# Patient Record
Sex: MALE | Race: BLACK OR AFRICAN AMERICAN
[De-identification: names, ages, dates, MRNs, and addresses within clinical notes are randomized per-mention and may not be internally consistent; named-entity substitution may affect disease eponyms.]

---

## 2020-12-04 ENCOUNTER — HOSPITAL ENCOUNTER (INPATIENT)
Dept: HOSPITAL 39 - ER | Age: 65
LOS: 7 days | Discharge: HOME | DRG: 177 | End: 2020-12-11
Attending: NURSE PRACTITIONER | Admitting: NURSE PRACTITIONER
Payer: MEDICARE

## 2020-12-04 DIAGNOSIS — E86.0: ICD-10-CM

## 2020-12-04 DIAGNOSIS — N17.9: ICD-10-CM

## 2020-12-04 DIAGNOSIS — Z88.5: ICD-10-CM

## 2020-12-04 DIAGNOSIS — Z79.899: ICD-10-CM

## 2020-12-04 DIAGNOSIS — R74.8: ICD-10-CM

## 2020-12-04 DIAGNOSIS — I10: ICD-10-CM

## 2020-12-04 DIAGNOSIS — U07.1: Primary | ICD-10-CM

## 2020-12-04 DIAGNOSIS — Z79.4: ICD-10-CM

## 2020-12-04 DIAGNOSIS — R79.1: ICD-10-CM

## 2020-12-04 DIAGNOSIS — I69.354: ICD-10-CM

## 2020-12-04 DIAGNOSIS — Z79.01: ICD-10-CM

## 2020-12-04 DIAGNOSIS — J12.89: ICD-10-CM

## 2020-12-04 DIAGNOSIS — M62.82: ICD-10-CM

## 2020-12-04 DIAGNOSIS — E11.9: ICD-10-CM

## 2020-12-04 DIAGNOSIS — Z79.82: ICD-10-CM

## 2020-12-04 NOTE — CT
EXAM: 



Chest w/o Contrast (accession [id number]), Abdomen/Pelvis w/o

Contrast (accession [id number])



HISTORY: 



dyspnea, COVID-19+, diarrhea



COMPARISON: 



None



TECHNIQUE: 



Contiguous axial images of the chest were obtained from the

thoracic inlet to the upper abdomen without intravenous contrast

followed by multiplanar reformats. This exam was performed

according to our departmental dose-optimization program, which

includes automated exposure control, adjustment of the mA and/or

kV according to patient size and/or use of iterative

reconstruction technique.



FINDINGS: 



Diffuse bilateral groundglass pulmonary opacities, consistent

with viral pneumonitis. No pneumothorax or pleural effusion. No

pneumomediastinum. Heart size normal. No pericardial effusion. No

mediastinal adenopathy. The central airways are patent. Great

vessels are normal.  



The liver, gallbladder, bile ducts, pancreas, spleen and adrenals

are normal. Left simple renal cyst. Otherwise the kidneys,

ureters and bladder are normal. The prostate is normal. The small

bowel is normal. The appendix is normal. The large bowel is

normal. No ascites, pneumatosis or pneumoperitoneum. No

lymphadenopathy. Aortoiliac atherosclerosis. Small fat-containing

umbilical hernia. Bilateral scrotal hydroceles noted.



No fracture or traumatic malalignment



IMPRESSION: 



1.  Diffuse pulmonary opacities consistent with viral pneumonitis

and provided history of COVID.

2.  No acute abdominal or pelvic abnormality. Specifically, there

are no complicating or other discernible features of

enterocolitis.



Electronically signed by:  Jey Loco MD  12/4/2020 10:44 PM

[facility] Workstation: [id number]

## 2020-12-04 NOTE — ED.PDOC
History of Present Illness





- General


Time Seen by Provider: 12/04/20 21:03


Source: patient





- History of Present Illness


Initial Comments: 





65-year-old male with medical history of hypertension, diabetes, hx of CVA who 

presents with chief complaint of shortness of breath.  Patient states his acute 

illness started 8 days ago, initially mild respiratory symptoms, body aches, 

etc.  He reports however in the past 3 days he has had worsening shortness of 

breath which further worsened today.  He now reports 7/10 severity dyspnea.  He 

reports he is unable to ambulate even short distances around the home due to 

shortness of breath.  Also having frequent cough which is occasionally 

productive.  He has been trying DayQuil at home with little relief.  He 

additionally reports fevers with T-max 102 Fahrenheit at home, chills, 

headaches, body aches, lower backaches, sore throat, occasional watery diarrhea.

 Denies any chest pain, abdominal pain, nausea/vomiting, leg swelling.  Appetite

is decreased.  Patient reports he tested positive for COVID-19 8 days ago 

shortly after initial symptoms presented.  He has not been prescribed any 

medications as an outpatient for acute illness.





Patient reports he recently moved here from [place] and has not established 

care with a PCP.  He takes blood pressure medications as well as glipizide and 

Lantus 30 units daily.  He also reports hx of CVA ~12 years ago and since then 

has taken Coumadin daily "to prevent stroke" - current dose is 5 mg daily.  

Denies known hx of a-fib, DVT, PE.








Allergies/Adverse Reactions: 


Allergies





Codeine Allergy (Verified 12/04/20 21:26)


   





Home Medications: 


Ambulatory Orders





Aspirin [Aspirin 81 Low Dose] 81 mg PO DAILY 12/04/20 


Dextromethorphan-Phenylephrine [Vicks Dayquil Cold & Flu] 1 cap PO PRN 12/04/20 


Glipizide [Glipizide Xl] 10 mg PO DAILY 12/04/20 


Hydrochlorothiazide 25 mg PO QAM 12/04/20 


Insulin Glargine 100U/ml [Lantus] 30 unit SUBCU BEDTIME 12/04/20 


Lisinopril 40 mg PO DAILY 12/04/20 


Loperamide Cap [Imodium Cap] 2 mg PO .Q2HR PRN 12/04/20 


Qb12 8 spray PO PRN 12/04/20 


Qboost 8 spray PO PRN 12/04/20 


Qc+ 8 spray PO PRN 12/04/20 


Qd3 8 spray PO PRN 12/04/20 


Qrelief 8 spray PO PRN 12/04/20 


Simvastatin 40 mg PO QPM 12/04/20 


Warfarin Sodium 5 mg PO DAILY 12/04/20 











Review of Systems





- Review of Systems


Review of Systems: 





12/04/20 21:18


as per HPI





All other Systems: Reviewed and Negative





Family Medical History





- Family History


  ** Mother


Family History: Unknown





Physical Exam





- Physical Exam


General Appearance: Alert, Comfortable, No apparent distress


Eye Exam: bilateral normal


Ears, Nose, Throat: hearing grossly normal, pharyngeal erythema - w/o tonsillar 

swelling/exudate


Neck: non-tender, full range of motion, supple, normal inspection


Respiratory: chest non-tender, rales - BL faint rales, worse at bases, w/o 

wheezing or rhonchi, good air movement throughout


Cardiovascular/Chest: normal peripheral pulses, no edema, no gallop, no JVD, no 

murmur, tachycardia


Peripheral Pulses: radial,right: 2+, radial,left: 2+


Gastrointestinal/Abdominal: non tender, soft


Back Exam: normal inspection, no CVA tenderness, no vertebral tenderness


Extremity: normal range of motion, non-tender, normal inspection, no pedal 

edema, no calf tenderness, normal capillary refill


Neurologic: CNs II-XII nml as tested, no motor/sensory deficits, alert, normal 

mood/affect, oriented x 3


Skin Exam: normal color, warm/dry





Progress





- Progress


Progress: 





12/04/20 21:19


Acute hypoxic respiratory effort


-Appears most likely due to acute COVID-19 pneumonia/infection.  Consider also 

bacterial pneumonia, sepsis, CHF, COPD/asthma, PE, ACS, other viral infections, 

strep, gastroenteritis, electrolyte derangement, hypoglycemia, other


-Patient with SPO2 86% on room air upon arrival, corrected to 95% on 2 L 

supplemental oxygen by nasal cannula.  Slight tachycardia present, vitals 

otherwise stable.  Patient is speaking in short sentences but appears 

comfortable and in no acute distress.  Faint crackles BL but no wheezing noted


-Obtain stat COVID-19 panel, cardiac work-up, RP 2 panel, strep, UA, lactate


-Place peripheral IV, 1 L normal saline bolus, Decadron 6 mg IV





12/04/20 22:10


-Labs pertinent for: WBC 12,800 with 81% segs, 10% lymphs, lactate 1.4.  T Bili 

2.1 (indirect 1.6, direct 0.5), alk phos 62, , , BUN 61, Cr 1.73, 

Na 131, K 4.3.  Trop <0.02, BNP <15.  D-dimer elevated 1730, LDH 1266, CRP 11.2 

- c/w COVID-19 infection.  Also with coagulopathy - INR 8.1, PTT 73.  CPK 

11,000.


-CXR reveals BL peripheral groundglass opacities.


-Given COVID-19, dyspnea, elevated T bili/LFTs, will obtain CT imaging chest/

abd/pelvis.  Given JANES, unable to use IV contrast.





12/05/20 00:23


-CT imaging revealed bilateral peripheral groundglass opacities consistent with 

COVID-19 pneumonia.  No other acute processes were noted in the 

chest/abdomen/pelvis.


-Patient has remained stable in the ED.  He is maintaining 94% SPO2 on 3 L by 

nasal cannula oxygen.  Continues to have mild tachycardia.  Blood pressure has 

been normal.  Patient reports that his dyspnea is improving with the 

supplemental oxygen and IV steroids given in the ED.  Discussed all findings 

with the patient as well as diagnoses of COVID-19 pneumonia, acute kidney 

injury, acute hepatic dysfunction, coagulopathy, rhabdomyolysis, dehydration.  

Suspect all of these diagnoses are sequelae of COVID-19 infection.


-I discussed the patient with Muna Jenkins who accepts for admission.  For the 

coagulopathy, pt will need to hold his Coumadin in the hospital.  Muna to decide

on possible Vitamin K administration.  Also to decide on Remdesivir given 

kidney/liver dysfunction.








Fidel Roberts MD


Billing #[id number]











12/04/20 21:02


IV Care:Saline Lock per [facility] STAT 


Isolation:Airborne ONCE 


Telemetry STAT 


URINALYSIS Stat 





12/04/20 21:15


EKG STAT 


Oxygen STAT 


Pulse Ox, Continuous Monitoring STAT 





12/04/20 21:16


STREP A SCREEN CULTURE Stat 





12/05/20 00:23


ED Intent to Admit Routine 





12/05/20 21:15


Oxygen STAT 


Pulse Ox, Continuous Monitoring STAT 





12/06/20 21:15


Pulse Ox, Continuous Monitoring STAT 








                         Laboratory Results - last 24 hr











  12/04/20 12/04/20 12/04/20





  21:16 21:17 21:17


 


WBC   12.8 H 


 


RBC   5.44 


 


Hgb   17.1 


 


Hct   49.7 


 


MCV   91.3 


 


MCH   31.5 H 


 


MCHC   34.5 


 


RDW   13.6 


 


Plt Count   249 


 


MPV   7.9 


 


Absolute Neuts (auto)   10.30 H 


 


Absolute Lymphs (auto)   1.40 


 


Absolute Monos (auto)   1.00 H 


 


Absolute Eos (auto)   0.00 


 


Absolute Basos (auto)   0.00 


 


Neutrophils %   80.9 H 


 


Lymphocytes %   10.6 L 


 


Monocytes %   8.2 


 


Eosinophils %   0.0 L 


 


Basophils %   0.3 


 


PT   


 


INR   


 


PTT (SP)    73.8 H*


 


D-Dimer, Quantitative    1730.0 H*


 


Sodium   


 


Potassium   


 


Chloride   


 


Carbon Dioxide   


 


Anion Gap   


 


BUN   


 


Creatinine   


 


BUN/Creatinine Ratio   


 


Random Glucose   


 


Serum Osmolality   


 


Lactic Acid   


 


Calcium   


 


Magnesium   


 


Total Bilirubin   


 


Direct Bilirubin   


 


Indirect Bilirubin   


 


AST   


 


ALT   


 


Alkaline Phosphatase   


 


LD Total   


 


Creatine Kinase   


 


Troponin I   


 


C-Reactive Protein   


 


B-Natriuretic Peptide   


 


Serum Total Protein   


 


Albumin   


 


Globulin   


 


Albumin/Globulin Ratio   


 


Group A Strep Rapid  Negative  














  12/04/20 12/04/20 12/04/20





  21:17 21:17 21:17


 


WBC   


 


RBC   


 


Hgb   


 


Hct   


 


MCV   


 


MCH   


 


MCHC   


 


RDW   


 


Plt Count   


 


MPV   


 


Absolute Neuts (auto)   


 


Absolute Lymphs (auto)   


 


Absolute Monos (auto)   


 


Absolute Eos (auto)   


 


Absolute Basos (auto)   


 


Neutrophils %   


 


Lymphocytes %   


 


Monocytes %   


 


Eosinophils %   


 


Basophils %   


 


PT   


 


INR   


 


PTT (SP)   


 


D-Dimer, Quantitative   


 


Sodium  131 L  


 


Potassium  4.3  


 


Chloride  94 L  


 


Carbon Dioxide  26  


 


Anion Gap  15.3  


 


BUN  61 H  


 


Creatinine  1.73 H  


 


BUN/Creatinine Ratio  35.3 H  


 


Random Glucose  197 H  


 


Serum Osmolality  285.4  


 


Lactic Acid    1.4


 


Calcium  8.0 L  


 


Magnesium  2.4  


 


Total Bilirubin  2.1 H*  


 


Direct Bilirubin   


 


Indirect Bilirubin   


 


AST  362 H  


 


ALT  205 H  


 


Alkaline Phosphatase  62  


 


LD Total  1266 H  


 


Creatine Kinase  46946 H*  


 


Troponin I   0.02 


 


C-Reactive Protein  11.2 H*  


 


B-Natriuretic Peptide  < 15.0  


 


Serum Total Protein  7.5  


 


Albumin  3.3  


 


Globulin  4.2 H  


 


Albumin/Globulin Ratio  0.8 L  


 


Group A Strep Rapid   














  12/04/20 12/04/20





  21:17 23:07


 


WBC  


 


RBC  


 


Hgb  


 


Hct  


 


MCV  


 


MCH  


 


MCHC  


 


RDW  


 


Plt Count  


 


MPV  


 


Absolute Neuts (auto)  


 


Absolute Lymphs (auto)  


 


Absolute Monos (auto)  


 


Absolute Eos (auto)  


 


Absolute Basos (auto)  


 


Neutrophils %  


 


Lymphocytes %  


 


Monocytes %  


 


Eosinophils %  


 


Basophils %  


 


PT   80.2 H*


 


INR   8.19 H*


 


PTT (SP)  


 


D-Dimer, Quantitative  


 


Sodium  


 


Potassium  


 


Chloride  


 


Carbon Dioxide  


 


Anion Gap  


 


BUN  


 


Creatinine  


 


BUN/Creatinine Ratio  


 


Random Glucose  


 


Serum Osmolality  


 


Lactic Acid  


 


Calcium  


 


Magnesium  


 


Total Bilirubin  2.1 H* 


 


Direct Bilirubin  0.6 H 


 


Indirect Bilirubin  1.5 H 


 


AST  


 


ALT  


 


Alkaline Phosphatase  


 


LD Total  


 


Creatine Kinase  


 


Troponin I  


 


C-Reactive Protein  


 


B-Natriuretic Peptide  


 


Serum Total Protein  


 


Albumin  


 


Globulin  


 


Albumin/Globulin Ratio  


 


Group A Strep Rapid  























- EKG/XRAY/CT


EKG: Sinus, Tachy - Sinus tachycardia, heart rate 100, no ST elevations noted, Q

waves noted in anteroseptal leads likely indicative of prior MI, axis normal, 

intervals normal, no prior EKG for comparison.


XRAY: chest - Bilateral peripheral groundglass opacities noted consistent with 

COVID-19 pneumonia per my read





Departure





- Departure


Clinical Impression: 


 Hypoxemia, Pneumonia due to COVID-19 virus, COVID-19, Rhabdomyolysis due to 

COVID-19, Acute kidney injury, Hepatic dysfunction, Dehydration, Coagulopathy





Time of Disposition: 00:23


Disposition: Admit Patient


Condition: Poor


Diet: diabetic diet


Home Medications: 


Ambulatory Orders





Aspirin [Aspirin 81 Low Dose] 81 mg PO DAILY 12/04/20 


Dextromethorphan-Phenylephrine [Vicks Dayquil Cold & Flu] 1 cap PO PRN 12/04/20 


Glipizide [Glipizide Xl] 10 mg PO DAILY 12/04/20 


Hydrochlorothiazide 25 mg PO QAM 12/04/20 


Insulin Glargine 100U/ml [Lantus] 30 unit SUBCU BEDTIME 12/04/20 


Lisinopril 40 mg PO DAILY 12/04/20 


Loperamide Cap [Imodium Cap] 2 mg PO .Q2HR PRN 12/04/20 


Qb12 8 spray PO PRN 12/04/20 


Qboost 8 spray PO PRN 12/04/20 


Qc+ 8 spray PO PRN 12/04/20 


Qd3 8 spray PO PRN 12/04/20 


Qrelief 8 spray PO PRN 12/04/20 


Simvastatin 40 mg PO QPM 12/04/20 


Warfarin Sodium 5 mg PO DAILY 12/04/20 











Critical Care Note





- Critical Care Note


Total Time (mins): 30


Comments: 





Critical Care Time: Upon my evaluation, this patient had a high probability of 

life-threatening deterioration due to COVID-19 pneumonia, acute hypoxia, which 

required my direct attention, intervention, and management. I have provided 30 

minutes of critical care time exclusive of separately billable procedures. My 

time included: direct patient care, review of labs and radiology, obtaining 

history from and counseling the patient, discussion with other medical 

personnel, documentation, and monitoring for potential decompensation.








Decision To Admit





- Decistion To Admit


Decision to Admit Reason: Admit from ER


Decision to Admit Date: 12/05/20


Decision to Admit Time: 00:23

## 2020-12-04 NOTE — RAD
XR CHEST 1 VIEW



HISTORY: COVID-19+, dyspnea.



COMPARISON: None.



FINDINGS:



The heart size is within normal limits. There is no pulmonary

vascular congestion. There is a patchy opacity at the left lung

base. No pleural effusions or pneumothorax. No acute bony

findings are seen.



IMPRESSION:



Patchy opacity in the left lung which may represent infection.



Electronically signed by:  Raul Nicholson MD  12/4/2020 9:59 PM CST

Workstation: [id number]

## 2020-12-04 NOTE — CT
EXAM: 



Chest w/o Contrast (accession [id number]), Abdomen/Pelvis w/o

Contrast (accession [id number])



HISTORY: 



dyspnea, COVID-19+, diarrhea



COMPARISON: 



None



TECHNIQUE: 



Contiguous axial images of the chest were obtained from the

thoracic inlet to the upper abdomen without intravenous contrast

followed by multiplanar reformats. This exam was performed

according to our departmental dose-optimization program, which

includes automated exposure control, adjustment of the mA and/or

kV according to patient size and/or use of iterative

reconstruction technique.



FINDINGS: 



Diffuse bilateral groundglass pulmonary opacities, consistent

with viral pneumonitis. No pneumothorax or pleural effusion. No

pneumomediastinum. Heart size normal. No pericardial effusion. No

mediastinal adenopathy. The central airways are patent. Great

vessels are normal.  



The liver, gallbladder, bile ducts, pancreas, spleen and adrenals

are normal. Left simple renal cyst. Otherwise the kidneys,

ureters and bladder are normal. The prostate is normal. The small

bowel is normal. The appendix is normal. The large bowel is

normal. No ascites, pneumatosis or pneumoperitoneum. No

lymphadenopathy. Aortoiliac atherosclerosis. Small fat-containing

umbilical hernia. Bilateral scrotal hydroceles noted.



No fracture or traumatic malalignment



IMPRESSION: 



1.  Diffuse pulmonary opacities consistent with viral pneumonitis

and provided history of COVID.

2.  No acute abdominal or pelvic abnormality. Specifically, there

are no complicating or other discernible features of

enterocolitis.



Electronically signed by:  Jey Loco MD  12/4/2020 10:44 PM

[facility] Workstation: [id number]

## 2020-12-05 PROCEDURE — XW033E5 INTRODUCTION OF REMDESIVIR ANTI-INFECTIVE INTO PERIPHERAL VEIN, PERCUTANEOUS APPROACH, NEW TECHNOLOGY GROUP 5: ICD-10-PCS | Performed by: NURSE PRACTITIONER

## 2020-12-05 RX ADMIN — REMDESIVIR SCH MLS/HR: 5 INJECTION INTRAVENOUS at 12:40

## 2020-12-05 RX ADMIN — INSULIN LISPRO SCH UNITS: 100 INJECTION, SOLUTION INTRAVENOUS; SUBCUTANEOUS at 08:00

## 2020-12-05 RX ADMIN — Medication SCH ML: at 20:58

## 2020-12-05 RX ADMIN — GUAIFENESIN SCH MG: 600 TABLET, EXTENDED RELEASE ORAL at 20:58

## 2020-12-05 RX ADMIN — ALBUTEROL SULFATE SCH PUFF: 90 AEROSOL, METERED RESPIRATORY (INHALATION) at 21:49

## 2020-12-05 RX ADMIN — INSULIN LISPRO SCH UNITS: 100 INJECTION, SOLUTION INTRAVENOUS; SUBCUTANEOUS at 21:01

## 2020-12-05 RX ADMIN — ALBUTEROL SULFATE SCH PUFF: 90 AEROSOL, METERED RESPIRATORY (INHALATION) at 12:50

## 2020-12-05 RX ADMIN — CEFTRIAXONE SCH MLS/HR: 1 INJECTION, POWDER, FOR SOLUTION INTRAMUSCULAR; INTRAVENOUS at 02:34

## 2020-12-05 RX ADMIN — ALBUTEROL SULFATE SCH PUFF: 90 AEROSOL, METERED RESPIRATORY (INHALATION) at 10:00

## 2020-12-05 RX ADMIN — PANTOPRAZOLE SODIUM SCH MG: 40 INJECTION, POWDER, FOR SOLUTION INTRAVENOUS at 06:17

## 2020-12-05 RX ADMIN — ALUMINUM ZIRCONIUM TRICHLOROHYDREX GLY SCH MG: 0.2 STICK TOPICAL at 10:50

## 2020-12-05 RX ADMIN — Medication SCH ML: at 09:30

## 2020-12-05 RX ADMIN — ALBUTEROL SULFATE SCH PUFF: 90 AEROSOL, METERED RESPIRATORY (INHALATION) at 17:18

## 2020-12-05 RX ADMIN — GUAIFENESIN SCH MG: 600 TABLET, EXTENDED RELEASE ORAL at 10:50

## 2020-12-05 RX ADMIN — Medication PRN MLS/HR: at 02:44

## 2020-12-05 RX ADMIN — DEXAMETHASONE SODIUM PHOSPHATE SCH MG: 10 INJECTION INTRAMUSCULAR; INTRAVENOUS at 10:50

## 2020-12-05 RX ADMIN — INSULIN LISPRO SCH UNITS: 100 INJECTION, SOLUTION INTRAVENOUS; SUBCUTANEOUS at 17:00

## 2020-12-05 RX ADMIN — AZITHROMYCIN DIHYDRATE SCH MLS/HR: 500 INJECTION, POWDER, LYOPHILIZED, FOR SOLUTION INTRAVENOUS at 06:18

## 2020-12-05 RX ADMIN — INSULIN LISPRO SCH UNITS: 100 INJECTION, SOLUTION INTRAVENOUS; SUBCUTANEOUS at 12:15

## 2020-12-05 RX ADMIN — INSULIN DETEMIR SCH UNITS: 100 INJECTION, SOLUTION SUBCUTANEOUS at 14:33

## 2020-12-05 NOTE — HP
SUPERVISING PHYSICIAN:   Chris Castellon MD



CHIEF COMPLAINT:  Shortness of breath.



HISTORY OF PRESENT ILLNESS:   Mr. Shine is a 65 year-old -American male 
who has a past medical history of hypertension, diabetes and a past CVA and 
presented to the Emergency Room for some increasing shortness of breath.  He 
noted initially that he had moved from [place] to [place] and prior to leaving
for [place] about 8 days ago, he tested negative for Covid-19.  On arrival to 
[place], after about 3 days of being here he started having some shortness of 
breath with some fever noted subjectively with a T-max of 102.  He then went to 
a walk-in clinic and was retested for Covid-19 and found to be positive.  
Initial laboratory studies in the Emergency Room showed a white count of 12,800 
with a left shift.  Also of note was that he is on Coumadin supposedly for a 
previous CVA and initially his PT in the Emergency Room was 80.2 with INR of 
8.19.  D-dimer was elevated at 1730.  The patient does endorse that during the 
move in the last 3 to 4 days he actually sustained a fall while trying to move 
some mattresses in a standing position but denies any bleeding or bruising.  He 
was not having any chest pain.  Vital signs showed he was mildly tachycardic 
with a pulse of 105, blood pressure 128/85, respirations 18.  Initially he was 
showing 85% saturation on room air.  After treatment was going to 96% on 2 liter
nasal cannula.  The remainder of his labs did show he had an initial blood sugar
of 197 with lactic acid normal at 1.4.  Bilirubin was elevated at 2.1 with 
elevated AST at 362 and ALT of 205.  Also of note was an elevated creatinine 
kinase at 11,069 and serum protein 11.2.  Troponins were 0.02 and less than 0.02
prior to admission.  His initial chemistries showed his creatinine at 1.73 with 
BUN of 61.  Given the degree of elevated CK levels, creatinine history and 
recent Covid-19 infection, the patient was started on treatment for Covid 
pneumonitis with azithromycin, Rocephin in Remdesivir.  He was also given an 
injection of vitamin K IM 5 mg for the supratherapeutic INR.  The patient is now
going to be admitted for further evaluation and management of Covid-19 and 
supratherapeutic INR with questionable rhabdomyolysis with elevated CK levels. 



PAST MEDICAL HISTORY: 

1.   Hypertension.

2.   Diabetes mellitus type 2.

3.   Thrombolytic CVA in , on Coumadin with left-sided lower extremity 
weakness, residual.



PAST SURGICAL HISTORY:  No  major surgeries listed.



CURRENT MEDICATIONS:

1.   Simvastatin 40 mg daily.

2.   Lisinopril 40 mg daily.

3.   Glipizide 10 mg daily.

4.   Coumadin 5 mg daily.

5.   Hydrochlorothiazide 25 mg daily.

6.   Lantus 30 units at bedtime.



ALLERGIES:  CODEINE



FAMILY HISTORY:   Mother  at age 78 of natural causes.  Father  
at age 72 with no listed major medical history.  He has 3 children all healthy. 
He has one brother, one sister reported healthy.



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 
from [place] to [place].  He is [employer], currently living with a girlfriend.
 He has no history of tobacco, alcohol or illicit drug use.



REVIEW OF SYSTEMS: 

GENERAL:   No general malaise with some reported fever, T-max 102 at home. No 
unintentional weight loss or weight gain.

HEENT:   Denies any headache, vision changes, sore throat, nasal congestion, 
earaches.

CHEST:  As noted in history of present illness, increasing shortness of breath 
with a nonproductive cough.

HEART:  Denies chest pain, palpitations or syncopal episodes. 

GASTROINTESTINAL:  Denies normal vaginal delivery, constipation or abdominal 
pain.

GENITOURINARY:  Denies any dysuria, hematuria, polyuria.

MUSCULOSKELETAL:  Denies arthralgias, joint swelling.

INTEGUMENT:  Denies lesions. moles or unexplained changes

NEUROLOGIC:  Denies ataxia, seizures, syncopal episodes, dizziness, headaches, 
vision changes or other focal deficits.

HEMATOLOGIC:  Denies unexplained bleeding, bruising or transfusion reaction.



PHYSICAL EXAMINATION: 



VITAL SIGNS:   Initially in the Emergency Room, temperature 96, pulse 103, blood
pressure 135/86, respirations 18 to 22 with room air saturations at 85% 
initially and improved with treatment up to 96% on 2 liter nasal cannula.



GENERAL:  The patient looks to be resting comfortably and in no acute distress. 
He is alert. 



HEENT:   Tympanic membranes are clear bilaterally.  Oropharynx is pink, moist 
without lesions.



NECK:   Supple, non-tender, full range of motion.  No jugular venous distention.



CHEST:  Lung sounds are fairly clear throughout, just a little diminished 
towards the bases.



CARDIOVASCULAR:  Regular rate and rhythm.



ABDOMEN:   Soft, non-tender, positive bowel sounds.



EXTREMITIES:   Without cyanosis, clubbing, or edema.



BACK:  Without CVA or vertebral tenderness.



NEUROLOGIC:   Cranial nerves II through XII grossly tact.  Facial features are 
symmetrical.  Extraocular movements are within normal limits with no notable 
nystagmus.  No discernible weakness when compared to lower extremities, either 
right or left.



SKIN:  Warm and normal color.  No lesions or rashes.



LABORATORY:  White count 12,800, hemoglobin 17.1, hematocrit 49.7, platelet 
count 349,000.  Differential does show a left shift.  Coagulation studies showed
an elevated D-dimer at 1730 with elevated INR of 8.19 with PT of 80.2 with PTT 
of 73.8.  Chemistries: sodium 131, glucose 197, potassium 4.3, initial BUN of 
61, creatinine 1.73, lactic acid 1.4, total bilirubin 2.1.  Direct bilirubin was
0.6 and indirect was 1.5.  , , creatinine kinase 1169 with  C-
reactive protein of 11.2.  He had 2 sets of troponin prior to admission and all 
within normal limits.  Group A rapid strep was negative.  Respiratory panel did 
show positive for Covid-19 but negative for all other bacterial and viral 
targets including influenza A and B.  



RADIOLOGY:  Abdomen/pelvic CT shows diffuse pulmonary opacities consistent with 
viral pneumonitis, no acute abdominal pelvic abnormalities.  Specifically no 
descendable features of enterocolitis.  CT of the chest, again diffuse pulmonary
opacities consistent with  pneumonitis.  



ASSESSMENT: 

1.  Covid-19 pneumonitis.

2.  Supratherapeutic INR with no signs of acute bleed, etiology uncertain.

3.  Poorly controlled hypertension.

4.  Diabetes mellitus type 2.

5.  History of previous CVA on Coumadin since .

6.  Rhabdomyolysis as noted with an elevated CPK level on admission, possibly 
due to a recent fall 

     with some exacerbation due to statin medications.

7.  Acute renal insufficiency likely due to some mild prerenal azotemia.

8.  Elevated liver functions including a total bilirubin, AST, ALT, etiology 
uncertain, with consideration due 

     to poor medical followup and drug interaction of drug side effects on 
statin with patient also showing 

     elevated CK levels.



PLAN:   Mr. Ángel Shine is going to be admitted for multiple problems and 
initially with Covid-19 pneumonitis for which he will be started on treatment 
for with Remdesivir, Decadron, Rocephin and azithromycin.  In regards to the 
elevated INR levels, we will hold off on any additional vitamin K as after 
discussion with the patient taking his Coumadin medication, probably less than 2
hours of being in the Emergency Room and having now completed. Therefore, we 
will observe his INRs in the morning.  He will be on sliding scale per protocol.
 He will be on Protonix for gastric protection.  Will go ahead and stat him on 
some Levemir and manage his blood sugar until better controlled.  I would 
anticipate his length of stay to be at least 2 or 3 days.  Until we can 
transition him to outpatient management, we will continue to monitor and treat 
as needed.



#[id number]

[facility]

## 2020-12-05 NOTE — RAD
EXAM:  XR Chest, 1 View



CLINICAL HISTORY:  The patient is 65 years old and is Male; covid



TECHNIQUE:  Frontal view of the chest.



COMPARISON:  Chest x-ray 12/4/2020.



FINDINGS:

  Lungs:  Diffuse bilateral airspace disease unchanged from the

prior exam.

  Pleural space:  Unremarkable.  No pneumothorax.

  Heart:  Unremarkable.  No cardiomegaly.

  Mediastinum:  Unremarkable.

  Bones/joints:  Unremarkable.



IMPRESSION:     

  Diffuse bilateral airspace disease unchanged from the prior

exam.



Electronically signed by:  Tomasz Dubois MD  12/5/2020 7:11

AM [facility] Workstation: [id number]

## 2020-12-06 RX ADMIN — PANTOPRAZOLE SODIUM SCH MG: 40 INJECTION, POWDER, FOR SOLUTION INTRAVENOUS at 05:54

## 2020-12-06 RX ADMIN — AZITHROMYCIN DIHYDRATE SCH MLS/HR: 500 INJECTION, POWDER, LYOPHILIZED, FOR SOLUTION INTRAVENOUS at 05:16

## 2020-12-06 RX ADMIN — ALBUTEROL SULFATE SCH PUFF: 90 AEROSOL, METERED RESPIRATORY (INHALATION) at 17:30

## 2020-12-06 RX ADMIN — INSULIN LISPRO SCH UNITS: 100 INJECTION, SOLUTION INTRAVENOUS; SUBCUTANEOUS at 11:30

## 2020-12-06 RX ADMIN — INSULIN LISPRO SCH UNITS: 100 INJECTION, SOLUTION INTRAVENOUS; SUBCUTANEOUS at 09:13

## 2020-12-06 RX ADMIN — LISINOPRIL SCH MG: 10 TABLET ORAL at 10:24

## 2020-12-06 RX ADMIN — INSULIN LISPRO SCH UNITS: 100 INJECTION, SOLUTION INTRAVENOUS; SUBCUTANEOUS at 20:22

## 2020-12-06 RX ADMIN — INSULIN LISPRO SCH UNITS: 100 INJECTION, SOLUTION INTRAVENOUS; SUBCUTANEOUS at 18:50

## 2020-12-06 RX ADMIN — ALBUTEROL SULFATE SCH PUFF: 90 AEROSOL, METERED RESPIRATORY (INHALATION) at 14:00

## 2020-12-06 RX ADMIN — ALBUTEROL SULFATE SCH PUFF: 90 AEROSOL, METERED RESPIRATORY (INHALATION) at 20:53

## 2020-12-06 RX ADMIN — REMDESIVIR SCH MLS/HR: 5 INJECTION INTRAVENOUS at 10:24

## 2020-12-06 RX ADMIN — INSULIN LISPRO SCH UNITS: 100 INJECTION, SOLUTION INTRAVENOUS; SUBCUTANEOUS at 09:12

## 2020-12-06 RX ADMIN — Medication SCH ML: at 20:15

## 2020-12-06 RX ADMIN — GUAIFENESIN SCH MG: 600 TABLET, EXTENDED RELEASE ORAL at 10:25

## 2020-12-06 RX ADMIN — Medication PRN MLS/HR: at 17:04

## 2020-12-06 RX ADMIN — CEFTRIAXONE SCH MLS/HR: 1 INJECTION, POWDER, FOR SOLUTION INTRAMUSCULAR; INTRAVENOUS at 01:55

## 2020-12-06 RX ADMIN — INSULIN LISPRO SCH UNITS: 100 INJECTION, SOLUTION INTRAVENOUS; SUBCUTANEOUS at 18:51

## 2020-12-06 RX ADMIN — PANTOPRAZOLE SODIUM SCH MG: 40 INJECTION, POWDER, FOR SOLUTION INTRAVENOUS at 05:16

## 2020-12-06 RX ADMIN — DEXAMETHASONE SODIUM PHOSPHATE SCH MG: 10 INJECTION INTRAMUSCULAR; INTRAVENOUS at 10:24

## 2020-12-06 RX ADMIN — Medication PRN MLS/HR: at 01:49

## 2020-12-06 RX ADMIN — INSULIN DETEMIR SCH UNITS: 100 INJECTION, SOLUTION SUBCUTANEOUS at 10:37

## 2020-12-06 RX ADMIN — Medication SCH ML: at 10:25

## 2020-12-06 RX ADMIN — ALBUTEROL SULFATE SCH PUFF: 90 AEROSOL, METERED RESPIRATORY (INHALATION) at 09:40

## 2020-12-06 RX ADMIN — GUAIFENESIN SCH MG: 600 TABLET, EXTENDED RELEASE ORAL at 20:15

## 2020-12-06 RX ADMIN — ALUMINUM ZIRCONIUM TRICHLOROHYDREX GLY SCH MG: 0.2 STICK TOPICAL at 10:24

## 2020-12-06 NOTE — PN
SUPERVISING PHYSICIAN: :  Chris Castellon MD



DATE:  12/06/20



SUBJECTIVE:  The patient says he feels pretty good today.  He has had no 
episodes of bleeding through the night.  His shortness of breath is not quite as
severe as yesterday.



OBJECTIVE:

VITAL SIGNS:  Temperature 98, pulse 97, blood pressure 158/97, respirations 18, 
oxygen saturation 94% on 2.5 liters nasal cannula.

GENERAL:  Patient looks to be resting comfortably.  He is alert.

CHEST:  Diminished bilaterality. No obvious rhonchi, rales, or wheezes.

HEART: Regular rate and rhythm.

ABDOMEN:  Soft, non-tender, positive bowel sound.

EXTREMITIES:  Without edema.

NEUROLOGICAL:  He is alert and oriented x 3.



LABORATORY:  Chemistries show normal electrolytes with creatinine 1.26, this is 
after admission of 1.73, calcium 8.1, blood sugar range between 187 and 246.  
Direct bilirubin is still elevated but down to 1.7 compared to 2.1 on admission.
 CK is down to 4255 from 1169.  Coagulation studies show INR today of 5.42.



MICROBIOLOGY:  Group A strep cultures pending.



RADIOLOGY:  No additional radiographic studies today.



ASSESSMENT: 

1.  Covid-19 pneumonitis.

2.  Supratherapeutic INR with no signs of acute bleed, etiology uncertain.

3.  Poorly controlled hypertension.

4.  Diabetes mellitus type 2.

5.  History of previous CVA on Coumadin since 2007.

6.  Rhabdomyolysis as noted with an elevated CPK level on admission, possibly 
due to a recent fall 

     with some exacerbation due to statin medications.

7.  Acute renal insufficiency likely due to some mild prerenal azotemia.

8.  Elevated liver functions including a total bilirubin, AST, ALT, etiology 
uncertain, with consideration due 

     to poor medical followup and drug interaction of drug side effects on 
statin with patient also showing 

     elevated CK levels.



PLAN: We will continue with current plan of care at his point for Covid-19 
pneumonitis with Remdesivir, Decadron, Rocephin and azithromycin. We are still 
holding his Coumadin, not giving any additional vitamin K.  His INR is now down 
to more acceptable levels.  He has not had any episodes of bleeding.  Will 
follow his labs including INR.  He remains on Protonix, we do have him on 
Levemir for control of his blood sugar on sliding scale.  Hopefully, we will be 
able to transition him to outpatient management in the next couple days.  Until 
then, we will continue to monitor and treat as needed..



#[id number]

MTDD

## 2020-12-07 RX ADMIN — INSULIN LISPRO SCH: 100 INJECTION, SOLUTION INTRAVENOUS; SUBCUTANEOUS at 09:50

## 2020-12-07 RX ADMIN — INSULIN LISPRO SCH UNITS: 100 INJECTION, SOLUTION INTRAVENOUS; SUBCUTANEOUS at 17:18

## 2020-12-07 RX ADMIN — GUAIFENESIN SCH MG: 600 TABLET, EXTENDED RELEASE ORAL at 09:55

## 2020-12-07 RX ADMIN — Medication SCH ML: at 09:56

## 2020-12-07 RX ADMIN — LISINOPRIL SCH MG: 10 TABLET ORAL at 09:55

## 2020-12-07 RX ADMIN — Medication PRN MLS/HR: at 20:09

## 2020-12-07 RX ADMIN — GUAIFENESIN SCH MG: 600 TABLET, EXTENDED RELEASE ORAL at 20:09

## 2020-12-07 RX ADMIN — ALBUTEROL SULFATE SCH PUFF: 90 AEROSOL, METERED RESPIRATORY (INHALATION) at 13:42

## 2020-12-07 RX ADMIN — ALBUTEROL SULFATE SCH PUFF: 90 AEROSOL, METERED RESPIRATORY (INHALATION) at 17:00

## 2020-12-07 RX ADMIN — CEFTRIAXONE SCH MLS/HR: 1 INJECTION, POWDER, FOR SOLUTION INTRAMUSCULAR; INTRAVENOUS at 02:02

## 2020-12-07 RX ADMIN — PANTOPRAZOLE SODIUM SCH MG: 40 INJECTION, POWDER, FOR SOLUTION INTRAVENOUS at 05:36

## 2020-12-07 RX ADMIN — ALUMINUM ZIRCONIUM TRICHLOROHYDREX GLY SCH MG: 0.2 STICK TOPICAL at 09:55

## 2020-12-07 RX ADMIN — DOCUSATE SODIUM SCH MG: 100 CAPSULE, LIQUID FILLED ORAL at 20:13

## 2020-12-07 RX ADMIN — INSULIN DETEMIR SCH UNITS: 100 INJECTION, SOLUTION SUBCUTANEOUS at 09:56

## 2020-12-07 RX ADMIN — ALBUTEROL SULFATE SCH PUFF: 90 AEROSOL, METERED RESPIRATORY (INHALATION) at 08:46

## 2020-12-07 RX ADMIN — REMDESIVIR SCH MLS/HR: 5 INJECTION INTRAVENOUS at 10:54

## 2020-12-07 RX ADMIN — INSULIN LISPRO SCH UNITS: 100 INJECTION, SOLUTION INTRAVENOUS; SUBCUTANEOUS at 13:41

## 2020-12-07 RX ADMIN — ALBUTEROL SULFATE SCH PUFF: 90 AEROSOL, METERED RESPIRATORY (INHALATION) at 20:45

## 2020-12-07 RX ADMIN — INSULIN LISPRO SCH: 100 INJECTION, SOLUTION INTRAVENOUS; SUBCUTANEOUS at 09:52

## 2020-12-07 RX ADMIN — Medication PRN MLS/HR: at 05:36

## 2020-12-07 RX ADMIN — DEXAMETHASONE SODIUM PHOSPHATE SCH MG: 10 INJECTION INTRAMUSCULAR; INTRAVENOUS at 09:55

## 2020-12-07 RX ADMIN — INSULIN LISPRO SCH UNITS: 100 INJECTION, SOLUTION INTRAVENOUS; SUBCUTANEOUS at 17:16

## 2020-12-07 RX ADMIN — Medication SCH ML: at 20:10

## 2020-12-07 RX ADMIN — INSULIN LISPRO SCH UNITS: 100 INJECTION, SOLUTION INTRAVENOUS; SUBCUTANEOUS at 20:20

## 2020-12-07 RX ADMIN — AZITHROMYCIN DIHYDRATE SCH MLS/HR: 500 INJECTION, POWDER, LYOPHILIZED, FOR SOLUTION INTRAVENOUS at 05:36

## 2020-12-07 RX ADMIN — INSULIN LISPRO SCH UNITS: 100 INJECTION, SOLUTION INTRAVENOUS; SUBCUTANEOUS at 13:42

## 2020-12-07 NOTE — PN
SUPERVISING PHYSICIAN: :  Romaine Reddy MD 



DATE:  12/07/20



SUBJECTIVE:  The patient is sitting up in bed.  He feels much better than 
previously.  We discussed his  plan of care as well as discharge planning.  He 
denies chest pain, nausea or vomiting.  He does get short of breath with 
exertion.  He also said he has not had a bowel movement since admission.



OBJECTIVE:

VITAL SIGNS:  Temperature 97.7, heart rate 89, blood pressure 144/78, 
respirations 18, oxygen saturation 95% on 2.5 liters nasal cannula.

CHEST:  Diminished bilaterality throughout. 

HEART: Regular rate and rhythm.

NEUROLOGICAL:  He is awake, alert and oriented x 3.



LABORATORY:  CBC is unremarkable with the exception he has a left shift on his 
differential.  PT 26.5, INR2.68, PTT 33.5, fibrinogen 485, D-dimer 1200, 
electrolytes are basically within normal limits with exception of magnesium is 
low at 1.7. BUN 32, creatinine 1.14, bilirubin 1.6.  , , creatinine
kinase 282.  



All other labs and films have been reviewed via the EMR. 



ASSESSMENT: 

1.  Covid-19 pneumonitis.

2.  Supratherapeutic INR with no signs of acute bleed, etiology uncertain.

3.  Poorly controlled hypertension.

4.  Diabetes mellitus type 2.

5.  History of previous CVA on Coumadin since 2007.

6.  Rhabdomyolysis as noted with an elevated CPK level on admission, possibly 
due to a recent fall 

     with some exacerbation due to statin medications.

7.  Acute renal insufficiency likely due to some mild prerenal azotemia.

8.  Elevated liver functions including a total bilirubin, AST, ALT, etiology 
uncertain, with consideration due 

     to poor medical followup and drug interaction of drug side effects on 
statin with patient also showing 

     elevated CK levels.



PLAN: We will continue present supportive care including Covid-19 guidelines 
which ill be continued including lab and medications.  We will continue to 
monitor his liver functions closely as well as Covid lab.  His fluids have been 
lowered to 50 mLs an hour.  I have also consulted  as I am not 
sure if he will need a [facility] provider on discharge as [facility] is his secondary 
insurance.  I will check his PT/INR in the morning and once that normalizes, I 
will start him on Eliquis.



#[id number]

[facility]

## 2020-12-08 RX ADMIN — DEXAMETHASONE SODIUM PHOSPHATE SCH MG: 10 INJECTION INTRAMUSCULAR; INTRAVENOUS at 11:15

## 2020-12-08 RX ADMIN — ALUMINUM ZIRCONIUM TRICHLOROHYDREX GLY SCH MG: 0.2 STICK TOPICAL at 11:15

## 2020-12-08 RX ADMIN — INSULIN LISPRO SCH UNITS: 100 INJECTION, SOLUTION INTRAVENOUS; SUBCUTANEOUS at 08:49

## 2020-12-08 RX ADMIN — INSULIN LISPRO SCH UNITS: 100 INJECTION, SOLUTION INTRAVENOUS; SUBCUTANEOUS at 16:55

## 2020-12-08 RX ADMIN — INSULIN LISPRO SCH UNITS: 100 INJECTION, SOLUTION INTRAVENOUS; SUBCUTANEOUS at 08:50

## 2020-12-08 RX ADMIN — INSULIN LISPRO SCH: 100 INJECTION, SOLUTION INTRAVENOUS; SUBCUTANEOUS at 12:45

## 2020-12-08 RX ADMIN — REMDESIVIR SCH MLS/HR: 5 INJECTION INTRAVENOUS at 12:46

## 2020-12-08 RX ADMIN — LISINOPRIL SCH MG: 10 TABLET ORAL at 11:15

## 2020-12-08 RX ADMIN — PANTOPRAZOLE SODIUM SCH MG: 40 TABLET, DELAYED RELEASE ORAL at 05:45

## 2020-12-08 RX ADMIN — ALBUTEROL SULFATE SCH: 90 AEROSOL, METERED RESPIRATORY (INHALATION) at 18:39

## 2020-12-08 RX ADMIN — INSULIN LISPRO SCH UNITS: 100 INJECTION, SOLUTION INTRAVENOUS; SUBCUTANEOUS at 21:26

## 2020-12-08 RX ADMIN — ALBUTEROL SULFATE SCH PUFF: 90 AEROSOL, METERED RESPIRATORY (INHALATION) at 21:45

## 2020-12-08 RX ADMIN — DOCUSATE SODIUM SCH MG: 100 CAPSULE, LIQUID FILLED ORAL at 20:25

## 2020-12-08 RX ADMIN — AZITHROMYCIN DIHYDRATE SCH MLS/HR: 500 INJECTION, POWDER, LYOPHILIZED, FOR SOLUTION INTRAVENOUS at 05:45

## 2020-12-08 RX ADMIN — Medication SCH ML: at 11:16

## 2020-12-08 RX ADMIN — CEFTRIAXONE SCH MLS/HR: 1 INJECTION, POWDER, FOR SOLUTION INTRAMUSCULAR; INTRAVENOUS at 02:23

## 2020-12-08 RX ADMIN — Medication SCH ML: at 20:25

## 2020-12-08 RX ADMIN — INSULIN LISPRO SCH UNITS: 100 INJECTION, SOLUTION INTRAVENOUS; SUBCUTANEOUS at 16:54

## 2020-12-08 RX ADMIN — ALBUTEROL SULFATE SCH PUFF: 90 AEROSOL, METERED RESPIRATORY (INHALATION) at 13:10

## 2020-12-08 RX ADMIN — INSULIN DETEMIR SCH UNITS: 100 INJECTION, SOLUTION SUBCUTANEOUS at 08:50

## 2020-12-08 RX ADMIN — Medication PRN MLS/HR: at 05:45

## 2020-12-08 RX ADMIN — DOCUSATE SODIUM SCH MG: 100 CAPSULE, LIQUID FILLED ORAL at 11:15

## 2020-12-08 RX ADMIN — GUAIFENESIN SCH MG: 600 TABLET, EXTENDED RELEASE ORAL at 11:15

## 2020-12-08 RX ADMIN — INSULIN LISPRO SCH UNITS: 100 INJECTION, SOLUTION INTRAVENOUS; SUBCUTANEOUS at 12:45

## 2020-12-08 RX ADMIN — GUAIFENESIN SCH MG: 600 TABLET, EXTENDED RELEASE ORAL at 20:25

## 2020-12-08 RX ADMIN — ALBUTEROL SULFATE SCH: 90 AEROSOL, METERED RESPIRATORY (INHALATION) at 09:00

## 2020-12-08 NOTE — RAD
EXAM:  XR Chest, 1 View



CLINICAL HISTORY:  covid



TECHNIQUE:  Frontal view of the chest.



COMPARISON:  12/5/2020



FINDINGS:

  Lungs:  Stable bilateral interstitial thickening with mild

superimposed airspace disease in both bases.

  Pleural space:  No pneumothorax or pleural effusion.

  Heart:  Normal cardiac size and configuration.

  Mediastinum:  No abnormality noted.

  Bones/joints:  No osseous destruction or sclerosis noted.



IMPRESSION:     

  Stable abnormalities as above.



Electronically signed by:  Jailene Alan MD  12/8/2020 7:26 AM

[facility] Workstation: [id number]

## 2020-12-09 RX ADMIN — ALBUTEROL SULFATE SCH PUFF: 90 AEROSOL, METERED RESPIRATORY (INHALATION) at 21:17

## 2020-12-09 RX ADMIN — INSULIN LISPRO SCH UNITS: 100 INJECTION, SOLUTION INTRAVENOUS; SUBCUTANEOUS at 08:44

## 2020-12-09 RX ADMIN — DEXAMETHASONE SODIUM PHOSPHATE SCH MG: 10 INJECTION INTRAMUSCULAR; INTRAVENOUS at 10:56

## 2020-12-09 RX ADMIN — DOCUSATE SODIUM SCH MG: 100 CAPSULE, LIQUID FILLED ORAL at 21:00

## 2020-12-09 RX ADMIN — INSULIN LISPRO SCH UNITS: 100 INJECTION, SOLUTION INTRAVENOUS; SUBCUTANEOUS at 19:11

## 2020-12-09 RX ADMIN — Medication SCH ML: at 21:01

## 2020-12-09 RX ADMIN — INSULIN LISPRO SCH UNITS: 100 INJECTION, SOLUTION INTRAVENOUS; SUBCUTANEOUS at 21:02

## 2020-12-09 RX ADMIN — CEFTRIAXONE SCH MLS/HR: 1 INJECTION, POWDER, FOR SOLUTION INTRAMUSCULAR; INTRAVENOUS at 02:30

## 2020-12-09 RX ADMIN — ALUMINUM ZIRCONIUM TRICHLOROHYDREX GLY SCH MG: 0.2 STICK TOPICAL at 10:56

## 2020-12-09 RX ADMIN — ALBUTEROL SULFATE SCH PUFF: 90 AEROSOL, METERED RESPIRATORY (INHALATION) at 16:50

## 2020-12-09 RX ADMIN — DOCUSATE SODIUM SCH MG: 100 CAPSULE, LIQUID FILLED ORAL at 10:56

## 2020-12-09 RX ADMIN — INSULIN LISPRO SCH UNITS: 100 INJECTION, SOLUTION INTRAVENOUS; SUBCUTANEOUS at 13:55

## 2020-12-09 RX ADMIN — PANTOPRAZOLE SODIUM SCH MG: 40 TABLET, DELAYED RELEASE ORAL at 05:38

## 2020-12-09 RX ADMIN — INSULIN LISPRO SCH UNITS: 100 INJECTION, SOLUTION INTRAVENOUS; SUBCUTANEOUS at 11:30

## 2020-12-09 RX ADMIN — LISINOPRIL SCH MG: 10 TABLET ORAL at 10:57

## 2020-12-09 RX ADMIN — INSULIN DETEMIR SCH UNITS: 100 INJECTION, SOLUTION SUBCUTANEOUS at 10:57

## 2020-12-09 RX ADMIN — ALBUTEROL SULFATE SCH PUFF: 90 AEROSOL, METERED RESPIRATORY (INHALATION) at 13:15

## 2020-12-09 RX ADMIN — ASPIRIN 81 MG SCH MG: 81 TABLET ORAL at 10:57

## 2020-12-09 RX ADMIN — ALBUTEROL SULFATE SCH PUFF: 90 AEROSOL, METERED RESPIRATORY (INHALATION) at 10:10

## 2020-12-09 RX ADMIN — GUAIFENESIN SCH MG: 600 TABLET, EXTENDED RELEASE ORAL at 10:56

## 2020-12-09 RX ADMIN — GLIPIZIDE SCH MG: 2.5 TABLET, FILM COATED, EXTENDED RELEASE ORAL at 11:30

## 2020-12-09 RX ADMIN — AZITHROMYCIN DIHYDRATE SCH MLS/HR: 500 INJECTION, POWDER, LYOPHILIZED, FOR SOLUTION INTRAVENOUS at 05:38

## 2020-12-09 RX ADMIN — Medication SCH ML: at 10:57

## 2020-12-09 RX ADMIN — GUAIFENESIN SCH MG: 600 TABLET, EXTENDED RELEASE ORAL at 21:00

## 2020-12-09 NOTE — PN
SUPERVISING PHYSICIAN: :  Romaine Reddy MD 



DATE:  12/08/20



SUBJECTIVE:  The patient is sitting up in bed.   i discussed his plan of care 
and discharge, that he needed a call from [facility].  He has no complaints of 
shortness of breath except with exertion.  We discussed him changing from 
Coumadin to Eliquis and he agreed to that.



OBJECTIVE:

VITAL SIGNS:  Temperature 97.9, heart rate 89, blood pressure 150/87, 
respirations 18, oxygen saturation 93% on 2.5 liters nasal cannula.

CHEST:  Essentially clear to auscultation bilaterally. 

HEART: Regular rate and rhythm.

NEUROLOGICAL:  He is awake, alert and oriented x 3.



LABORATORY:  CBC 18.3, hemoglobin 14.5, hematocrit 42.1.  He has a left shift on
his differential.  INR 2.17, fibrinogen 439, D-dimer did go up to 3,780.  
Electrolytes are basically within normal limits except for calcium is low at 8.1
and magnesium low at 1.6.  BUN 28, creatinine 1.18.  Glucose 228, bilirubin 1.4,
AST 83, , , creatinine kinase 881.  C-reactive protein 1.  



Chest x-ray, abnormalities showing interstitial thickening with mild 
superimposed airspace disease in both bases.  Other findings are per radiology 
report.



ASSESSMENT: 

1.  Covid-19 pneumonitis.

2.  Supratherapeutic INR with no signs of acute bleed, etiology uncertain,

      improved.

3.  Poorly controlled hypertension.

4.  Diabetes mellitus type 2.

5.  History of previous CVA.  His Coumadin has been discontinued 

      and he will be started on Eliquis.

6.  Rhabdomyolysis as noted with an elevated CPK level on admission, 

     his statin medication has been discontinued and his CPK continues

     to improve. 

7.  Acute renal insufficiency, improved. 

8.  Elevated liver functions that are slowly improving.



PLAN: We will continue present supportive care including Covid-19 guidelines.  I
will order labs for in the morning,.  I have started him on Eliquis today as 
well as his aspirin.  I have given him a small dose of long-acting insulin.  He 
also receives supplemental  magnesium.  Today, I have stopped his fluids.  We 
will follow and treat as needed.



#[id number]

MTDD

## 2020-12-10 RX ADMIN — INSULIN LISPRO SCH UNITS: 100 INJECTION, SOLUTION INTRAVENOUS; SUBCUTANEOUS at 12:43

## 2020-12-10 RX ADMIN — ASPIRIN 81 MG SCH MG: 81 TABLET ORAL at 08:12

## 2020-12-10 RX ADMIN — INSULIN LISPRO SCH UNITS: 100 INJECTION, SOLUTION INTRAVENOUS; SUBCUTANEOUS at 21:08

## 2020-12-10 RX ADMIN — ALUMINUM ZIRCONIUM TRICHLOROHYDREX GLY SCH MG: 0.2 STICK TOPICAL at 08:12

## 2020-12-10 RX ADMIN — ALBUTEROL SULFATE SCH PUFF: 90 AEROSOL, METERED RESPIRATORY (INHALATION) at 20:44

## 2020-12-10 RX ADMIN — INSULIN LISPRO SCH UNITS: 100 INJECTION, SOLUTION INTRAVENOUS; SUBCUTANEOUS at 08:13

## 2020-12-10 RX ADMIN — CEFTRIAXONE SCH MLS/HR: 1 INJECTION, POWDER, FOR SOLUTION INTRAMUSCULAR; INTRAVENOUS at 02:12

## 2020-12-10 RX ADMIN — GUAIFENESIN SCH MG: 600 TABLET, EXTENDED RELEASE ORAL at 21:04

## 2020-12-10 RX ADMIN — Medication SCH ML: at 21:05

## 2020-12-10 RX ADMIN — AZITHROMYCIN DIHYDRATE SCH MLS/HR: 500 INJECTION, POWDER, LYOPHILIZED, FOR SOLUTION INTRAVENOUS at 05:55

## 2020-12-10 RX ADMIN — LISINOPRIL SCH MG: 10 TABLET ORAL at 08:14

## 2020-12-10 RX ADMIN — DOCUSATE SODIUM SCH MG: 100 CAPSULE, LIQUID FILLED ORAL at 21:04

## 2020-12-10 RX ADMIN — INSULIN LISPRO SCH: 100 INJECTION, SOLUTION INTRAVENOUS; SUBCUTANEOUS at 08:13

## 2020-12-10 RX ADMIN — DEXAMETHASONE SODIUM PHOSPHATE SCH MG: 10 INJECTION INTRAMUSCULAR; INTRAVENOUS at 08:13

## 2020-12-10 RX ADMIN — INSULIN LISPRO SCH UNITS: 100 INJECTION, SOLUTION INTRAVENOUS; SUBCUTANEOUS at 17:43

## 2020-12-10 RX ADMIN — PANTOPRAZOLE SODIUM SCH: 40 TABLET, DELAYED RELEASE ORAL at 05:56

## 2020-12-10 RX ADMIN — DOCUSATE SODIUM SCH MG: 100 CAPSULE, LIQUID FILLED ORAL at 08:12

## 2020-12-10 RX ADMIN — ALBUTEROL SULFATE SCH PUFF: 90 AEROSOL, METERED RESPIRATORY (INHALATION) at 13:00

## 2020-12-10 RX ADMIN — ALBUTEROL SULFATE SCH PUFF: 90 AEROSOL, METERED RESPIRATORY (INHALATION) at 15:55

## 2020-12-10 RX ADMIN — INSULIN DETEMIR SCH UNITS: 100 INJECTION, SOLUTION SUBCUTANEOUS at 08:14

## 2020-12-10 RX ADMIN — ALBUTEROL SULFATE SCH PUFF: 90 AEROSOL, METERED RESPIRATORY (INHALATION) at 09:20

## 2020-12-10 RX ADMIN — GLIPIZIDE SCH: 2.5 TABLET, FILM COATED, EXTENDED RELEASE ORAL at 09:41

## 2020-12-10 RX ADMIN — Medication SCH ML: at 09:41

## 2020-12-10 RX ADMIN — GUAIFENESIN SCH MG: 600 TABLET, EXTENDED RELEASE ORAL at 08:14

## 2020-12-10 NOTE — US
EXAM DESCRIPTION: Ultrasound Abdomen Complete



CLINICAL HISTORY: covid; elevated LFTs



COMPARISON: None Available.



TECHNIQUE: Complete abdominal ultrasound



FINDINGS: 



Visualized portions of the pancreas are unremarkable. No

peripancreatic fluid. Bowel gas obscures some areas.



Normal caliber of the aorta.



Normal appearance of the inferior vena cava.



Liver parenchyma is homogeneous in texture with increased

echogenicity.  No liver mass or intrahepatic bile duct

dilatation. No liver surface irregularity.

Normal appearance of hepatic veins and portal vein.



Gallbladder appears normal with no intraluminal stones. No

gallbladder wall thickening.



Common bile duct is normal in caliber measuring 3.4 mm.



The right kidney measures 9.7 cm in length. Normal renal cortical

echogenicity. The renal cortical thickness appears normal. No

right renal mass, shadowing stone or cyst. There is no

hydronephrosis.



Spleen is normal in size. No focal splenic lesion.



The left kidney measures 10.4 cm in length. Normal renal cortical

echogenicity. The renal cortical thickness appears normal. No

left renal mass or shadowing stone. Cyst in the upper cortex of

the left kidney appears simple and measures 3 cm. There is no

hydronephrosis.



IMPRESSION:



No acute upper abdominal process.



Electronically signed by:  Wilber Oviedo MD  12/10/2020 2:00

PM [facility] Workstation: [id number]

## 2020-12-10 NOTE — RAD
CHEST, ONE VIEW XR  



CLINICAL HISTORY:  covid



COMPARISON: 12/8/2020



TECHNIQUE: AP Chest.



FINDINGS: 



Heart is normal in size.  Normal cardiomediastinal contours.



There are airspace opacities throughout the right lung and left

lower lobe, relatively worse.  No pneumothorax or effusion.  No

pulmonary edema.



Unremarkable soft tissues and bones.



IMPRESSION:



1.  Worsening scattered right lung and left lower lobe pneumonia.



Electronically signed by:  Naomi Virk DO  12/10/2020 7:49 AM

[facility] Workstation: [id number]

## 2020-12-10 NOTE — PN
SUPERVISING PHYSICIAN: :  Romaine Reddy MD 



DATE:  12/09/20



SUBJECTIVE:  The patient is sitting in bed.  He has no complaints of shortness 
of breath, nausea or vomiting.  I discussed his plan of care and we will titrate
off his oxygen tomorrow and he will also have physical therapy.  He has no 
requests at this time.



OBJECTIVE:

VITAL SIGNS:  Temperature 98, heart rate 120 and had previously been 84, blood 
pressure 130/83, respirations 18, oxygen saturation 97% on 2 liters nasal 
cannula.

RESPIRATORY:  Somewhat diminished at the bases, but otherwise clear to 
auscultation.

CARDIAC:  Regular rate and rhythm.

NEUROLOGICAL:  He is awake, alert and oriented x3.



LABORATORY:  WBC 12,500, hemoglobin 14.8, hematocrit 42.5.  Blood sugars have 
run between 180 and 260.  Electrolytes are basically within normal limits.  BUN 
26, creatinine 1.13.  Calcium 8.1, magnesium 1.5.  Bilirubin 1.4, AST 44, ALT 
136, creatinine kinase 502.  All other labs and films have been reviewed via the
EMR.



ASSESSMENT: 

1.  COVID-19 pneumonitis.

2.  Supratherapeutic INR with no signs of acute bleed, etiology uncertain, 
improved.

3.  Poorly controlled hypertension.

4.  Diabetes mellitus, type 2.

5.  History of previous cerebrovascular accident.  His Coumadin has been 
discontinued 

     and he will be started on Eliquis.

6.  Rhabdomyolysis as noted with an elevated CPK level on admission, his statin

     medication has been discontinued and his CPK continues to improve. 

7.  Acute renal insufficiency, improved. 

8.  Elevated liver functions that are slowly improving.



PLAN: We will continue present supportive care and continue to titrate his 
oxygen down as tolerated.  He will have an ambulatory study tomorrow.  He may be
discharged home on home oxygen.  He received magnesium supplementation.  I will 
order COVID labs and chest x-ray in the morning.  I have also ordered him to be 
NPO in the morning with an abdominal sonogram to rule out any other issue with 
his abdomen as cause for his elevated liver function tests. 



#[id number]

MTDD

## 2020-12-11 VITALS — SYSTOLIC BLOOD PRESSURE: 122 MMHG | TEMPERATURE: 97 F | OXYGEN SATURATION: 92 % | DIASTOLIC BLOOD PRESSURE: 82 MMHG

## 2020-12-11 RX ADMIN — Medication SCH ML: at 09:03

## 2020-12-11 RX ADMIN — INSULIN LISPRO SCH UNITS: 100 INJECTION, SOLUTION INTRAVENOUS; SUBCUTANEOUS at 12:10

## 2020-12-11 RX ADMIN — CEFTRIAXONE SCH MLS/HR: 1 INJECTION, POWDER, FOR SOLUTION INTRAMUSCULAR; INTRAVENOUS at 02:55

## 2020-12-11 RX ADMIN — ALBUTEROL SULFATE SCH: 90 AEROSOL, METERED RESPIRATORY (INHALATION) at 12:30

## 2020-12-11 RX ADMIN — LISINOPRIL SCH MG: 10 TABLET ORAL at 09:03

## 2020-12-11 RX ADMIN — GUAIFENESIN SCH MG: 600 TABLET, EXTENDED RELEASE ORAL at 09:02

## 2020-12-11 RX ADMIN — DEXAMETHASONE SODIUM PHOSPHATE SCH MG: 10 INJECTION INTRAMUSCULAR; INTRAVENOUS at 09:03

## 2020-12-11 RX ADMIN — INSULIN LISPRO SCH UNITS: 100 INJECTION, SOLUTION INTRAVENOUS; SUBCUTANEOUS at 07:30

## 2020-12-11 RX ADMIN — ASPIRIN 81 MG SCH MG: 81 TABLET ORAL at 09:02

## 2020-12-11 RX ADMIN — PANTOPRAZOLE SODIUM SCH MG: 40 TABLET, DELAYED RELEASE ORAL at 06:22

## 2020-12-11 RX ADMIN — DOCUSATE SODIUM SCH MG: 100 CAPSULE, LIQUID FILLED ORAL at 09:02

## 2020-12-11 RX ADMIN — AZITHROMYCIN DIHYDRATE SCH MLS/HR: 500 INJECTION, POWDER, LYOPHILIZED, FOR SOLUTION INTRAVENOUS at 05:45

## 2020-12-11 RX ADMIN — GLIPIZIDE SCH MG: 2.5 TABLET, FILM COATED, EXTENDED RELEASE ORAL at 09:02

## 2020-12-11 RX ADMIN — INSULIN DETEMIR SCH UNITS: 100 INJECTION, SOLUTION SUBCUTANEOUS at 10:08

## 2020-12-11 RX ADMIN — ALBUTEROL SULFATE SCH PUFF: 90 AEROSOL, METERED RESPIRATORY (INHALATION) at 08:50

## 2020-12-11 RX ADMIN — ALUMINUM ZIRCONIUM TRICHLOROHYDREX GLY SCH MG: 0.2 STICK TOPICAL at 09:02

## 2020-12-11 NOTE — PN
SUPERVISING PHYSICIAN: Romaine Reddy MD 



DATE:  12/10/20



SUBJECTIVE:  The patient is sitting up in bed.  He feels great.  We discussed 
his discharge plan and changing from Coumadin to Eliquis.  He also said that SAIMA Poe, spoke with him several times today and they have a plan in place to 
get his primary care physician changed to Darius and he realizes he will have to
go home on oxygen and his O2 is ordered.



OBJECTIVE:

VITAL SIGNS:  Temperature 97.7, heart rate 89, blood pressure 123/83, 
respirations 18, oxygen saturation 95% on 2 liters nasal cannula.

RESPIRATORY:  Essentially clear to auscultation bilaterally.

CARDIAC:  Regular rate and rhythm.

NEUROLOGICAL:  He is awake, alert and oriented x3.



LABORATORY:  WBC 14.7 with left shift on differential.  The remainder of his CBC
is within normal limits.  Fibrinogen 444, D-dimer 3,230.  electrolytes are 
basically within normal limits with the exception of magnesium is slightly at 
1.7.  Bilirubin 1.3, alkaline phosphatase 125, , creatinine kinase 337.  



RADIOLOGY:  Chest x-ray shows worsening scattered right lung and left lower lobe
pneumonia.  His abdominal ultrasound shows no acute upper abdominal process.  
All other labs and films have been reviewed via the EMR.



ASSESSMENT: 

1.  COVID-19 pneumonitis.

2.  Supratherapeutic INR with no signs of acute bleed, etiology uncertain, 
improved.

3.  Poorly controlled hypertension.

4.  Diabetes mellitus, type 2.

5.  History of previous cerebrovascular accident.  His Coumadin has been 
discontinued 

     and he will be started on Eliquis.

6.  Rhabdomyolysis as noted with an elevated CPK level on admission, his statin

     medication has been discontinued and his CPK continues to improve. 

7.  Acute renal insufficiency, improved. 

8.  Elevated liver functions that are slowly improving.



PLAN: We will continue present supportive care.  His oxygen has been ordered for
home use.  His chest x-ray was somewhat worse, but clinically, he has improved 
to the point where he should be able to be discharged.  We will have physical 
therapy check on him tomorrow.  He will also get some samples of Eliquis that he
can go home on to change from the Coumadin.  He has Dania Davis's number, so if he
runs into any problems after discharge, he can call her and get those 
straightened out.  We will continue to monitor the patient closely and follow as
needed.  



#[id number]

[facility]

## 2020-12-14 NOTE — DS
SUPERVISING PHYSICIAN:  Romaine Reddy MD



ADMISSION DIAGNOSIS:

1.  COVID-19 pneumonitis.

2.  Supratherapeutic INR with no signs of acute bleed, etiology uncertain.

3.  Poorly controlled hypertension.

4.  Diabetes mellitus type 2.

5.  History of previous CVA on Coumadin since 2007.

6.  Rhabdomyolysis as noted with an elevated CPK level on admission, possibly 
due to 

     a recent fall with some exacerbation due to statin medications.

7.  Acute renal insufficiency likely due to some mild prerenal azotemia.

8.  Elevated liver functions including a total bilirubin, AST, ALT, etiology 
uncertain, with 

     consideration due to poor medical followup and drug interaction of drug 
side effects

     on statin with patient also showing elevated CK levels.



DISCHARGE DIAGNOSIS: 

1.  COVID-19 pneumonitis.

2.  Supratherapeutic INR, transitioned to Eliquis with no active signs of acute 
bleeding.

3.  Poorly controlled hypertension.

4.  Diabetes mellitus, type 2, poorly controlled.

5.  Chronic Coumadin usage due to previous cerebrovascular accidents with the 
patient

     transitioned to Eliquis.

6.  Rhabdomyolysis, probably secondary to a recent fall prior to admission and

     exacerbated by his statin, which was discontinued at discharge.

7.  Acute renal insufficiency, improved. 

8.  Elevated liver functions, improving, possibly related to statin usage.



REASON FOR HOSPITALIZATION:  Mr. Shine is a 65 year-old -American male 
who has a past medical history of hypertension, diabetes and a past CVA and 
presented to the Emergency Room for some increasing shortness of breath.  He 
noted initially that he had moved from [place] to [place] and prior to leaving
for [place] about 8 days ago, he tested negative for COVID-19.  On arrival to 
[place], after about 3 days of being here he started having some shortness of 
breath with some fever noted subjectively with a T-max of 102.  He then went to 
a walk-in clinic and was retested for COVID-19 and found to be positive.  
Initial laboratory studies in the Emergency Room showed a white count of 12,800 
with a left shift.  Also of note was that he is on Coumadin supposedly for a 
previous CVA and initially his PT in the Emergency Room was 80.2 with INR of 
8.19.  D-dimer was elevated at 1730.  The patient does endorse that during the 
move in the last 3 to 4 days he actually sustained a fall while trying to move 
some mattresses in a standing position but denies any bleeding or bruising.  He 
was not having any chest pain.  Vital signs showed he was mildly tachycardic 
with a pulse of 105, blood pressure 128/85, respirations 18.  Initially he was 
showing 85% saturation on room air.  After treatment was going to 96% on 2 liter
nasal cannula.  The remainder of his labs did show he had an initial blood sugar
of 197 with lactic acid normal at 1.4.  Bilirubin was elevated at 2.1 with 
elevated AST at 362 and ALT of 205.  Also of note was an elevated creatinine 
kinase at 11,069 and serum protein 11.2.  Troponins were 0.02 and less than 0.02
prior to admission.  His initial chemistries showed his creatinine at 1.73 with 
BUN of 61.  Given the degree of elevated CK levels, creatinine history and 
recent COVID-19 infection, the patient was started on treatment for COVID 
pneumonitis with azithromycin, Rocephin in Remdesivir.  He was also given an 
injection of vitamin K IM 5 mg for the supratherapeutic INR.  The patient is now
going to be admitted for further evaluation and management of COVID-19 and 
supratherapeutic INR with questionable rhabdomyolysis with elevated CK levels. 



LABORATORY:  White count at discharge 14,700, hemoglobin 15.5, hematocrit 45.6. 
Differential did show a left shift.  Coagulation studies were showing PT had 
normalized to 29.9 as well as INR 1.9.  Fibrinogen 444, D-dimer 3230.  
Chemistries showed normal electrolytes, creatinine down to 1.21.  Blood sugars 
ranged between 144 and 278.  Magnesium 1.7, bilirubin down to 1.3.  Liver 
functions showed AST that had normalized to 33.  ALT had returned to almost 
baseline levels since admission from 205 to 125.  CK levels 337 compared to 
admission of 11,069.  Group A strep negative.



MICROBIOLOGY:  Respiratory panel was positive for COVID, but negative for all 
other bacterial and viral targets.  Group A strep culture was negative.



RADIOLOGY:  Abdominopelvic CT on admission without contrast showed diffuse 
pulmonary opacities consistent with viral pneumonitis.  No acute abnormality or 
pelvic abnormalities noted.  CT of the chest showed diffuse pulmonary opacities 
consistent with viral pneumonitis.  No acute abdominal or pelvic abnormalities. 
There were no complicating other discernible features of enterocolitis.  Please 
see those reports for details.  He also had an abdomen ultrasound and per 
radiologic interpretation showed no acute upper abdominal process.  Pleas see 
all those reports for details.



HOSPITAL COURSE: Ms. Shine was admitted for COVID pneumonitis and treated with 
Rocephin, azithromycin, Remdesivir, Decadron.  He was showing good response to 
treatment.  He was hard to get off of oxygen prior to discharge.  He was 
transitioned to Eliquis from Coumadin due to supratherapeutic INR and the fact 
that he had an elevated D-dimer.  At time of discharge, his vital signs showed 
he was afebrile at 97, pulse 76, saturation 96% on room air, blood pressure 
103/69.  



PLAN: Mr. Shine was discharged on 12/11/20.  He was encouraged to contact either
[facility] or [facility] to establish with Dr. German, to call their office on 12/14/20.  If not, then followup with his 
secondary [facility] through the [facility].  He was given instructions to return to the 
ER should he have any worsening symptoms.  He was given instructions on COVID 
isolation and treatment.  He was to resume his usual diabetic diet.  He was 
encouraged to take his medications as described prior to hospitalization 
including the ones prescribed on discharge which included:

1.  Align 4 mg daily, #30, no refills.

2.  Decadron 6 mg daily, #5, no refills.

3.  Eliquis 5 mg twice daily, #56 pills, samples provided by [facility] and to be 
continued by

     [facility] or [facility].

4.  Guaifenesin 600 mg twice daily as needed.

5.  Cefdinir 300 mg twice daily for continued treatment of pneumonia.

6.  Albuterol inhaler 2 puffs as needed and p.r.n., no refills.



DISPOSITION:  The patient was discharged home.



CONDITION ON DISCHARGE:  Stable and improved.  



#[id number]

[facility]